# Patient Record
Sex: FEMALE | Race: WHITE | NOT HISPANIC OR LATINO | Employment: UNEMPLOYED | URBAN - METROPOLITAN AREA
[De-identification: names, ages, dates, MRNs, and addresses within clinical notes are randomized per-mention and may not be internally consistent; named-entity substitution may affect disease eponyms.]

---

## 2018-06-21 ENCOUNTER — OFFICE VISIT (OUTPATIENT)
Dept: FAMILY MEDICINE CLINIC | Facility: CLINIC | Age: 23
End: 2018-06-21
Payer: COMMERCIAL

## 2018-06-21 VITALS
BODY MASS INDEX: 34.66 KG/M2 | DIASTOLIC BLOOD PRESSURE: 60 MMHG | SYSTOLIC BLOOD PRESSURE: 122 MMHG | WEIGHT: 203 LBS | HEIGHT: 64 IN | OXYGEN SATURATION: 99 % | HEART RATE: 66 BPM

## 2018-06-21 DIAGNOSIS — Z3A.13 13 WEEKS GESTATION OF PREGNANCY: Primary | ICD-10-CM

## 2018-06-21 DIAGNOSIS — F41.1 GENERALIZED ANXIETY DISORDER: ICD-10-CM

## 2018-06-21 PROBLEM — Z34.00 SUPERVISION OF NORMAL FIRST PREGNANCY, ANTEPARTUM: Status: ACTIVE | Noted: 2018-05-21

## 2018-06-21 PROBLEM — Z78.9 NONIMMUNE TO HEPATITIS B VIRUS: Status: ACTIVE | Noted: 2018-05-21

## 2018-06-21 PROBLEM — O98.319 GENITAL HERPES AFFECTING PREGNANCY, ANTEPARTUM: Status: ACTIVE | Noted: 2018-05-21

## 2018-06-21 PROBLEM — O99.210 MATERNAL MORBID OBESITY, ANTEPARTUM (HCC): Status: ACTIVE | Noted: 2018-05-21

## 2018-06-21 PROBLEM — E66.01 MATERNAL MORBID OBESITY, ANTEPARTUM (HCC): Status: ACTIVE | Noted: 2018-05-21

## 2018-06-21 PROBLEM — A60.09 GENITAL HERPES AFFECTING PREGNANCY, ANTEPARTUM: Status: ACTIVE | Noted: 2018-05-21

## 2018-06-21 PROCEDURE — 99203 OFFICE O/P NEW LOW 30 MIN: CPT | Performed by: PHYSICIAN ASSISTANT

## 2018-06-21 NOTE — PROGRESS NOTES
Assessment/Plan:    13 weeks gestation of pregnancy  Referral to ob/gyn    Generalized anxiety disorder  Resolved since no longer in college  Diagnoses and all orders for this visit:    13 weeks gestation of pregnancy  -     Ambulatory referral to Obstetrics / Gynecology; Future    Generalized anxiety disorder    Other orders  -     Discontinue: IRON PO; Take 1 tablet by mouth daily  -     Prenatal Multivit-Min-Fe-FA (PRE-RONALD FORMULA) TABS; Take by mouth          Subjective:      Patient ID: Frank Junior is a 25 y o  female  26 y/o F G1Po female who is 13 weeks pregnant presents to establish care, primarily for OB referral   She will be leaving to move [place] country next week but was told she did not have a referral for OBGYN, her past visits would not be covered  Normal pregnancy, had screening labs done which were all normal   Not yet experiencing feel movement  No vaginal bleeding, pelvic pain, leg swelling, chest pain or shortness of breath  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  She has a history of anxiety and utilizing SSRI, benzodiazepine, and therapy but since her college career has been completed she has not had an issue with anxiety and has not been utilizing any therapy  Denies anhedonia, trouble sleeping, trouble concentrating, suicidal ideation        The following portions of the patient's history were reviewed and updated as appropriate: allergies, current medications, past family history, past medical history, past social history, past surgical history and problem list     Review of Systems   Constitutional: Negative for diaphoresis, fatigue and fever  HENT: Negative for congestion, ear pain, hearing loss, postnasal drip, rhinorrhea and sore throat  Eyes: Negative for pain, redness and visual disturbance  Respiratory: Negative for cough, shortness of breath and wheezing  Cardiovascular: Negative for chest pain, palpitations and leg swelling  Gastrointestinal: Negative for anal bleeding, constipation, diarrhea, nausea and vomiting  Endocrine: Negative for polydipsia and polyuria  Genitourinary: Negative for dysuria, flank pain and hematuria  Musculoskeletal: Negative for arthralgias, back pain, joint swelling and myalgias  Skin: Negative for pallor, rash and wound  Neurological: Negative for dizziness, syncope, numbness and headaches  Hematological: Negative for adenopathy  Does not bruise/bleed easily  Psychiatric/Behavioral: Negative for dysphoric mood and sleep disturbance  The patient is not nervous/anxious  Objective:      /60 (BP Location: Right arm, Patient Position: Sitting, Cuff Size: Standard)   Pulse 66   Ht 5' 4" (1 626 m)   Wt 92 1 kg (203 lb)   LMP 03/18/2018 (Exact Date)   SpO2 99%   BMI 34 84 kg/m²          Physical Exam   Constitutional: She is oriented to person, place, and time  She appears well-developed and well-nourished  No distress  HENT:   Head: Normocephalic and atraumatic  Mouth/Throat: Oropharynx is clear and moist    Eyes: Conjunctivae and EOM are normal  Pupils are equal, round, and reactive to light  Right eye exhibits no discharge  Left eye exhibits no discharge  No scleral icterus  Neck: Normal range of motion  Neck supple  No thyromegaly present  Cardiovascular: Normal rate, regular rhythm and normal heart sounds  Exam reveals no gallop and no friction rub  No murmur heard  Pulmonary/Chest: Effort normal and breath sounds normal  No respiratory distress  She has no wheezes  She has no rales  Abdominal: Soft  She exhibits no distension and no mass  There is no tenderness  There is no rebound and no guarding  Musculoskeletal: Normal range of motion  She exhibits no edema  Lymphadenopathy:     She has no cervical adenopathy  Neurological: She is alert and oriented to person, place, and time  No cranial nerve deficit  Skin: Skin is warm and dry  No rash noted   She is not diaphoretic  No erythema  No pallor